# Patient Record
Sex: FEMALE | Race: WHITE | Employment: FULL TIME
[De-identification: names, ages, dates, MRNs, and addresses within clinical notes are randomized per-mention and may not be internally consistent; named-entity substitution may affect disease eponyms.]

---

## 2018-03-22 ENCOUNTER — HOSPITAL ENCOUNTER (OUTPATIENT)
Dept: PHYSICAL THERAPY | Facility: CLINIC | Age: 38
Setting detail: THERAPIES SERIES
Discharge: HOME | End: 2018-03-22
Payer: COMMERCIAL

## 2018-03-22 PROCEDURE — 97162 PT EVAL MOD COMPLEX 30 MIN: CPT

## 2018-03-22 PROCEDURE — 97140 MANUAL THERAPY 1/> REGIONS: CPT

## 2018-03-22 NOTE — CONSULTS
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[employer]; has a standing desk; not exercising; doing PT in [place];     Pain:  [x] Yes  [] No Location: L neck/trap and headache Pain Rating: (0-10 scale) 5/10  Pain altered Tx:    [x] No    Symptoms:   [x] Worsening [x] Same  Better:  [x] AM        [x]Stand    [x] Walk       [x] Other: cool weather, icy hot, hot from shower  Worse: [] AM    [x] PM    [x] Sit      Sleep: [x] OK -some difficulty falling asleep; not really waking anymore      Objective:      STRENGTH  STRENGTH  ROM    Left Right  Left Right Cervical    C5 Shld Abd   L1-2 Hip Flex   Flexion 50   Shld Flexion   Hip Abd   Extension 55   Shld IR   L3-4 Knee Ext   Rotation L 70 R 78   Shld ER   L4 Ankle DF   Sidebend L 38 R 30   C6 Elb Flex   L5 EHL   Retraction 80% of normal   C7 Elb Ext   S1 Plant. Flex   Lumbar    C8 EPL 5 5 Abdominals   Flexion    T1 Fing Abd 5 5 Erector Spinae   Extension          Rotation L  R         TESTS (+/-) LEFT RIGHT Not Tested   Cerv. Comp -  []   Cerv. Distraction -  []       OBSERVATION No Deficit Deficit Not Tested Comments   Posture       Forward Head [x] [] []    Rounded Shoulders [x] [] []    Kyphosis [x] [] []    Lordosis [] [x] [] increase   Lateral Shift [x] [] []    Scoliosis [x] [] []    Iliac Crest [x] [] []    PSIS [] [] []    ASIS [] [] []    Genu Valgus [] [] []    Genu Varus [] [] []    Genu Recurvatum [] [] []    Pronation [] [] []    Supination [] [] []    Leg Length Discrp [] [] []    Slumped Sitting [] [] []    Palpation [] [x] [] Tender L UT, paraspinals, scalenes, occiput   Sensation [] [] []    Edema [] [] []    Neurological [] [] []      Comments: NDI: 32% affected function    Assessment:  Problems:    [] ? Back Pain:    [x] ? Cervical Pain: 5/10 with headaches and L UE pain   [x] ?

## 2018-03-26 ENCOUNTER — HOSPITAL ENCOUNTER (OUTPATIENT)
Dept: PHYSICAL THERAPY | Facility: CLINIC | Age: 38
Setting detail: THERAPIES SERIES
Discharge: HOME | End: 2018-03-26
Payer: COMMERCIAL

## 2018-03-26 PROCEDURE — 97140 MANUAL THERAPY 1/> REGIONS: CPT

## 2018-03-26 NOTE — FLOWSHEET NOTE
[] Maddy Cardona       Outpatient Physical        Therapy       [address]       Phone: [phone]       Fax: [phone] [x] Merged with [facility] at [address] [facility]       Phone: [phone]       Fax: [phone] [] [facility]  [address] [facility]   Phone: [phone]   Fax:  [phone]     Physical Therapy Daily Treatment Note    Date:  3/26/2018  Patient Name:  Michael Delgado    :  1980  MRN: [id number]  Physician: Aleksandra Mendoza MD               [facility]  Medical Diagnosis: neck pain                        Rehab Codes: M54.2; M79.1; O13; M79.622  Onset Date: 2017               Next 's appt. :   Visit# / total visits:  Cancels/No Shows: 0    Subjective:    Pain:  [x] Yes  [] No Location: L UT Pain Rating: (0-10 scale) 6/10  Pain altered Tx:  [x] No  [] Yes  Action:  Comments: increased discomfort after first treatment; then did a hard ball massage on UT and this tightened her up even more. Objective:  Modalities:  HP to upper back, neck and L UT at the end of the session, 15 min in supine  Precautions:  Exercises:  Exercise Reps/ Time Weight/ Level Comments    UT 2 way stretch x CR Started 3/26/18                                           Other:  Not today: Dry needlin\" B greater occipital homeostatic point; 2 1\" L T3,4 paravertebral point; 1\" R spinal accessory and removed;other needles left in situ 10 min. MFR: prone work to upper back, UTs, Levator scapula on L, rhomboids bilaterally, infra/supraspinatus and teres major/latissimus; supine work to [employer], scalenes; manual c-traction without appreciable benefit; OBR had to be modified due to increased discomfort with completing this technique; all pressure used was mild to moderate this date.     MET: UT and levator release techniques; also positional release for LUT    Not today: kinesiotape: unloading B

## 2018-04-04 ENCOUNTER — HOSPITAL ENCOUNTER (OUTPATIENT)
Dept: PHYSICAL THERAPY | Facility: CLINIC | Age: 38
Setting detail: THERAPIES SERIES
Discharge: HOME | End: 2018-04-04
Payer: COMMERCIAL

## 2018-04-04 PROCEDURE — 97140 MANUAL THERAPY 1/> REGIONS: CPT

## 2018-04-11 ENCOUNTER — HOSPITAL ENCOUNTER (OUTPATIENT)
Dept: PHYSICAL THERAPY | Facility: CLINIC | Age: 38
Setting detail: THERAPIES SERIES
Discharge: HOME | End: 2018-04-11
Payer: COMMERCIAL

## 2018-04-11 PROCEDURE — 97140 MANUAL THERAPY 1/> REGIONS: CPT

## 2018-04-16 ENCOUNTER — APPOINTMENT (OUTPATIENT)
Dept: PHYSICAL THERAPY | Facility: CLINIC | Age: 38
End: 2018-04-16
Payer: COMMERCIAL

## 2018-04-30 ENCOUNTER — HOSPITAL ENCOUNTER (OUTPATIENT)
Dept: PHYSICAL THERAPY | Facility: CLINIC | Age: 38
Setting detail: THERAPIES SERIES
Discharge: HOME | End: 2018-04-30
Payer: COMMERCIAL

## 2018-05-07 ENCOUNTER — HOSPITAL ENCOUNTER (OUTPATIENT)
Dept: PHYSICAL THERAPY | Facility: CLINIC | Age: 38
Setting detail: THERAPIES SERIES
Discharge: HOME | End: 2018-05-07
Payer: COMMERCIAL

## 2018-05-07 NOTE — FLOWSHEET NOTE
[] [facility]        Outpatient Physical                Therapy       [address]       Phone: [phone]       Fax: [phone] [x] [facility]       Promotion at [address] [place] [address]       Phone: [phone]       Fax: [phone] [] [facility]      for [facility]     [address] [place] [address]      Phone: [phone]      Fax:  [phone]     Physical Therapy Cancel/No Show note    Date: 2018  Patient: Constantino Kebede  : 1980  MRN: [id number]    Cancels/No Shows to date: 2 cx    For today's appointment patient:  [x]  Cancelled  []  Rescheduled appointment  []  No-show     Reason given by patient:  []  Patient ill  []  Conflicting appointment  []  No transportation    []  Conflict with work  [x]  No reason given  []  Weather related  []  Other:      Comments:      [x]  Next appointment was confirmed 18    Electronically signed by: Jelani Chandler PT

## 2018-05-14 ENCOUNTER — HOSPITAL ENCOUNTER (OUTPATIENT)
Dept: PHYSICAL THERAPY | Facility: CLINIC | Age: 38
Setting detail: THERAPIES SERIES
Discharge: HOME | End: 2018-05-14
Payer: COMMERCIAL

## 2018-05-14 PROCEDURE — 97140 MANUAL THERAPY 1/> REGIONS: CPT

## 2018-05-28 ENCOUNTER — APPOINTMENT (OUTPATIENT)
Dept: PHYSICAL THERAPY | Facility: CLINIC | Age: 38
End: 2018-05-28
Payer: COMMERCIAL

## 2018-06-04 ENCOUNTER — HOSPITAL ENCOUNTER (OUTPATIENT)
Dept: PHYSICAL THERAPY | Facility: CLINIC | Age: 38
Setting detail: THERAPIES SERIES
Discharge: HOME | End: 2018-06-04
Payer: COMMERCIAL

## 2018-06-04 PROCEDURE — 97140 MANUAL THERAPY 1/> REGIONS: CPT

## 2018-06-11 ENCOUNTER — HOSPITAL ENCOUNTER (OUTPATIENT)
Dept: PHYSICAL THERAPY | Facility: CLINIC | Age: 38
Setting detail: THERAPIES SERIES
Discharge: HOME | End: 2018-06-11
Payer: COMMERCIAL

## 2018-06-18 ENCOUNTER — HOSPITAL ENCOUNTER (OUTPATIENT)
Dept: PHYSICAL THERAPY | Facility: CLINIC | Age: 38
Setting detail: THERAPIES SERIES
Discharge: HOME | End: 2018-06-18
Payer: COMMERCIAL

## 2018-06-18 NOTE — FLOWSHEET NOTE
[] [employer]        Outpatient Physical                Therapy       [address]       Phone: [phone]       Fax: [phone] [x] [facility] at [address] [facility]       Phone: [phone]       Fax: [phone] [] [employer]  [address] [place] [address] Promotion     [address] [facility]      Phone: [phone]      Fax:  [phone] [facility] Out[facility]    [address] [facility],   [address]  Phone [phone]  Fax  [phone]     Physical Therapy Cancel/No Show note    Date: 2018  Patient: Karie Ramirez  : 1980  MRN: [id number]    Cancels/No Shows to date: 4 cx    For today's appointment patient:  [x]  Cancelled  []  Rescheduled appointment  []  No-show     Reason given by patient:  []  Patient ill  []  Conflicting appointment  []  No transportation    []  Conflict with work  [x]  No reason given  []  Weather related  []  Other:      Comments:      [x]  Next appointment was confirmed 18, 6:00 pm    Electronically signed by: Clarissa Coyne PT

## 2018-06-25 ENCOUNTER — HOSPITAL ENCOUNTER (OUTPATIENT)
Dept: PHYSICAL THERAPY | Facility: CLINIC | Age: 38
Setting detail: THERAPIES SERIES
Discharge: HOME | End: 2018-06-25
Payer: COMMERCIAL

## 2018-06-27 ENCOUNTER — HOSPITAL ENCOUNTER (OUTPATIENT)
Dept: PHYSICAL THERAPY | Facility: CLINIC | Age: 38
Setting detail: THERAPIES SERIES
Discharge: HOME | End: 2018-06-27
Payer: COMMERCIAL

## 2018-08-15 ENCOUNTER — HOSPITAL ENCOUNTER (OUTPATIENT)
Dept: PHYSICAL THERAPY | Facility: CLINIC | Age: 38
Setting detail: THERAPIES SERIES
Discharge: HOME | End: 2018-08-15
Payer: COMMERCIAL

## 2018-08-15 NOTE — DISCHARGE SUMMARY
Massage       [] Lumbar/Cervical Traction  [] Neuromuscular Re-education [x] Cold/hotpack [] Iontophoresis: 4 mg/mL  [x] Instruction in Home Exercise Program                     Dexamethasone Sodium  [x] Manual Therapy             Phosphate 40-80 mAmin  [] Aquatic Therapy                   [] Vasocompression/    [] Other:             Game Ready    Discharge Status:     [] Pt recovered from conditions. Treatment goals were met. [] Pt received maximum benefit. No further therapy indicated at this time. [] Pt to continue exercise/home instructions independently. [] Therapy interrupted due to:    [] Pt has 2 or more no shows/cancels, is discontinued per our policy. [] Pt has completed prescribed number of treatment sessions. [x] Other: pursuing other treatments        Electronically signed by Cecilia Emanuel, PT on 8/15/2018 at 4:05 PM      If you have any questions or concerns, please don't hesitate to call.   Thank you for your referral.